# Patient Record
Sex: MALE | Race: WHITE | NOT HISPANIC OR LATINO | Employment: FULL TIME | URBAN - METROPOLITAN AREA
[De-identification: names, ages, dates, MRNs, and addresses within clinical notes are randomized per-mention and may not be internally consistent; named-entity substitution may affect disease eponyms.]

---

## 2022-02-15 ENCOUNTER — APPOINTMENT (EMERGENCY)
Dept: RADIOLOGY | Facility: HOSPITAL | Age: 57
End: 2022-02-15
Payer: COMMERCIAL

## 2022-02-15 ENCOUNTER — HOSPITAL ENCOUNTER (EMERGENCY)
Facility: HOSPITAL | Age: 57
Discharge: HOME | End: 2022-02-15
Attending: EMERGENCY MEDICINE | Admitting: EMERGENCY MEDICINE
Payer: COMMERCIAL

## 2022-02-15 VITALS
BODY MASS INDEX: 28.62 KG/M2 | DIASTOLIC BLOOD PRESSURE: 88 MMHG | HEART RATE: 64 BPM | WEIGHT: 235 LBS | OXYGEN SATURATION: 97 % | SYSTOLIC BLOOD PRESSURE: 137 MMHG | RESPIRATION RATE: 17 BRPM | TEMPERATURE: 98 F | HEIGHT: 76 IN

## 2022-02-15 DIAGNOSIS — R06.02 SHORTNESS OF BREATH: ICD-10-CM

## 2022-02-15 DIAGNOSIS — R07.89 ATYPICAL CHEST PAIN: Primary | ICD-10-CM

## 2022-02-15 LAB
2HR DELTA HS TROPONIN: 0 NG/L
ALBUMIN SERPL BCP-MCNC: 4.1 G/DL (ref 3.5–5)
ALP SERPL-CCNC: 81 U/L (ref 46–116)
ALT SERPL W P-5'-P-CCNC: 29 U/L (ref 12–78)
ANION GAP SERPL CALCULATED.3IONS-SCNC: 10 MMOL/L (ref 4–13)
APTT PPP: 28 SECONDS (ref 23–37)
AST SERPL W P-5'-P-CCNC: 19 U/L (ref 5–45)
ATRIAL RATE: 76 BPM
ATRIAL RATE: 80 BPM
BASOPHILS # BLD AUTO: 0.01 THOUSANDS/ΜL (ref 0–0.1)
BASOPHILS NFR BLD AUTO: 0 % (ref 0–1)
BILIRUB SERPL-MCNC: 0.2 MG/DL (ref 0.2–1)
BUN SERPL-MCNC: 21 MG/DL (ref 5–25)
CALCIUM SERPL-MCNC: 8.9 MG/DL (ref 8.3–10.1)
CARDIAC TROPONIN I PNL SERPL HS: 2 NG/L
CARDIAC TROPONIN I PNL SERPL HS: 2 NG/L
CHLORIDE SERPL-SCNC: 103 MMOL/L (ref 100–108)
CO2 SERPL-SCNC: 28 MMOL/L (ref 21–32)
CREAT SERPL-MCNC: 0.95 MG/DL (ref 0.6–1.3)
D DIMER PPP FEU-MCNC: 0.4 UG/ML FEU
EOSINOPHIL # BLD AUTO: 0.1 THOUSAND/ΜL (ref 0–0.61)
EOSINOPHIL NFR BLD AUTO: 3 % (ref 0–6)
ERYTHROCYTE [DISTWIDTH] IN BLOOD BY AUTOMATED COUNT: 11.9 % (ref 11.6–15.1)
FLUAV RNA RESP QL NAA+PROBE: NEGATIVE
FLUBV RNA RESP QL NAA+PROBE: NEGATIVE
GFR SERPL CREATININE-BSD FRML MDRD: 88 ML/MIN/1.73SQ M
GLUCOSE SERPL-MCNC: 117 MG/DL (ref 65–140)
HCT VFR BLD AUTO: 43.6 % (ref 36.5–49.3)
HGB BLD-MCNC: 14.4 G/DL (ref 12–17)
IMM GRANULOCYTES # BLD AUTO: 0.02 THOUSAND/UL (ref 0–0.2)
IMM GRANULOCYTES NFR BLD AUTO: 1 % (ref 0–2)
INR PPP: 0.98 (ref 0.84–1.19)
LYMPHOCYTES # BLD AUTO: 1.02 THOUSANDS/ΜL (ref 0.6–4.47)
LYMPHOCYTES NFR BLD AUTO: 28 % (ref 14–44)
MCH RBC QN AUTO: 30.3 PG (ref 26.8–34.3)
MCHC RBC AUTO-ENTMCNC: 33 G/DL (ref 31.4–37.4)
MCV RBC AUTO: 92 FL (ref 82–98)
MONOCYTES # BLD AUTO: 0.38 THOUSAND/ΜL (ref 0.17–1.22)
MONOCYTES NFR BLD AUTO: 10 % (ref 4–12)
NEUTROPHILS # BLD AUTO: 2.16 THOUSANDS/ΜL (ref 1.85–7.62)
NEUTS SEG NFR BLD AUTO: 58 % (ref 43–75)
NRBC BLD AUTO-RTO: 0 /100 WBCS
NT-PROBNP SERPL-MCNC: 18 PG/ML
P AXIS: 21 DEGREES
P AXIS: 24 DEGREES
PLATELET # BLD AUTO: 154 THOUSANDS/UL (ref 149–390)
PMV BLD AUTO: 10.2 FL (ref 8.9–12.7)
POTASSIUM SERPL-SCNC: 4.4 MMOL/L (ref 3.5–5.3)
PR INTERVAL: 162 MS
PR INTERVAL: 170 MS
PROT SERPL-MCNC: 7.2 G/DL (ref 6.4–8.2)
PROTHROMBIN TIME: 12.9 SECONDS (ref 11.6–14.5)
QRS AXIS: 11 DEGREES
QRS AXIS: 5 DEGREES
QRSD INTERVAL: 142 MS
QRSD INTERVAL: 142 MS
QT INTERVAL: 384 MS
QT INTERVAL: 386 MS
QTC INTERVAL: 434 MS
QTC INTERVAL: 442 MS
RBC # BLD AUTO: 4.75 MILLION/UL (ref 3.88–5.62)
RSV RNA RESP QL NAA+PROBE: NEGATIVE
SARS-COV-2 RNA RESP QL NAA+PROBE: NEGATIVE
SODIUM SERPL-SCNC: 141 MMOL/L (ref 136–145)
T WAVE AXIS: 41 DEGREES
T WAVE AXIS: 49 DEGREES
VENTRICULAR RATE: 76 BPM
VENTRICULAR RATE: 80 BPM
WBC # BLD AUTO: 3.69 THOUSAND/UL (ref 4.31–10.16)

## 2022-02-15 PROCEDURE — 36415 COLL VENOUS BLD VENIPUNCTURE: CPT

## 2022-02-15 PROCEDURE — 99285 EMERGENCY DEPT VISIT HI MDM: CPT

## 2022-02-15 PROCEDURE — 99285 EMERGENCY DEPT VISIT HI MDM: CPT | Performed by: EMERGENCY MEDICINE

## 2022-02-15 PROCEDURE — 85379 FIBRIN DEGRADATION QUANT: CPT | Performed by: EMERGENCY MEDICINE

## 2022-02-15 PROCEDURE — 84484 ASSAY OF TROPONIN QUANT: CPT

## 2022-02-15 PROCEDURE — 85730 THROMBOPLASTIN TIME PARTIAL: CPT

## 2022-02-15 PROCEDURE — 93005 ELECTROCARDIOGRAM TRACING: CPT

## 2022-02-15 PROCEDURE — 85610 PROTHROMBIN TIME: CPT

## 2022-02-15 PROCEDURE — 71045 X-RAY EXAM CHEST 1 VIEW: CPT

## 2022-02-15 PROCEDURE — 85025 COMPLETE CBC W/AUTO DIFF WBC: CPT

## 2022-02-15 PROCEDURE — 83880 ASSAY OF NATRIURETIC PEPTIDE: CPT | Performed by: EMERGENCY MEDICINE

## 2022-02-15 PROCEDURE — 0241U HB NFCT DS VIR RESP RNA 4 TRGT: CPT

## 2022-02-15 PROCEDURE — 93010 ELECTROCARDIOGRAM REPORT: CPT | Performed by: INTERNAL MEDICINE

## 2022-02-15 PROCEDURE — 80053 COMPREHEN METABOLIC PANEL: CPT

## 2022-02-15 RX ORDER — LISINOPRIL 10 MG/1
10 TABLET ORAL DAILY
COMMUNITY

## 2022-02-15 RX ORDER — METOPROLOL SUCCINATE 50 MG/1
50 TABLET, EXTENDED RELEASE ORAL DAILY
COMMUNITY

## 2022-02-15 RX ORDER — ROSUVASTATIN CALCIUM 10 MG/1
10 TABLET, COATED ORAL DAILY
COMMUNITY

## 2022-02-15 NOTE — ED NOTES
Pt arrives to the ED from home with c/o shortness of breath this morning  Pt reports that he has had chest pain "on and off" for the past "couple of months"  Pt reports chest pain as "sharp, all over my body"  Reports hx of x2 stent placements "a long time ago"  Reports following with Cardiologist    Denies pulmonary history, reports smoking daily  Also reports drinking a 6 pack of beer daily after work  Pt stated, "I think I had COVID 3-4 weeks ago, it was running through work  I never got tested, I just stayed home"  Reports he is vaccinated against COVID  Pt AO x 4, talking in full sentences, appears in no acute distress at this time       Stan Wheat, RN  02/15/22 9614

## 2022-02-15 NOTE — ED PROVIDER NOTES
History  Chief Complaint   Patient presents with    Chest Pain     pt c/o of sob and chest pain  14-year-old male with past medical history of hypertension, high cholesterol, previous stents presents for evaluation of shortness of breath and sharp chest pain, states that he has been having these symptoms for the last few months since at least October and did discuss it with his cardiologist then though nothing was found, this morning felt very short of breath with tingling down his arms while he was laying down, got better after couple of hours currently just has some mild diffuse chest pain that is sharp, no further shortness of breath  He states that these episodes happen when he is at rest and when he is exerting himself,  Took aspirin prior to arrival   Thinks he had COVID a couple months ago but was never tested, vaccinated with 2 doses of Ray Kim did not get his booster, no sick contacts     No history of DVT or PE  Patient does smoke daily and drinks alcohol daily, does not want to speak to [facility] for assistance in quitting          Prior to Admission Medications   Prescriptions Last Dose Informant Patient Reported? Taking?   lisinopril (ZESTRIL) 10 mg tablet   Yes No   Sig: Take 10 mg by mouth daily   metoprolol succinate (TOPROL-XL) 50 mg 24 hr tablet   Yes No   Sig: Take 50 mg by mouth daily   rosuvastatin (CRESTOR) 10 MG tablet   Yes No   Sig: Take 10 mg by mouth daily      Facility-Administered Medications: None       Past Medical History:   Diagnosis Date    High cholesterol     Hypertension        Past Surgical History:   Procedure Laterality Date    APPENDECTOMY         No family history on file  I have reviewed and agree with the history as documented      E-Cigarette/Vaping     E-Cigarette/Vaping Substances     Social History     Tobacco Use    Smoking status: Current Every Day Smoker    Smokeless tobacco: Never Used   Substance Use Topics    Alcohol use: Yes     Comment: 6 pack a day    Drug use: Not on file       Review of Systems   Constitutional: Negative for appetite change, chills and fever  HENT: Negative for rhinorrhea and sore throat  Eyes: Negative for photophobia and visual disturbance  Respiratory: Positive for chest tightness and shortness of breath  Negative for cough  Cardiovascular: Positive for chest pain  Negative for palpitations  Gastrointestinal: Negative for abdominal pain and diarrhea  Genitourinary: Negative for dysuria, frequency and urgency  Skin: Negative for rash  Neurological: Negative for dizziness and weakness  All other systems reviewed and are negative  Physical Exam  Physical Exam  Vitals and nursing note reviewed  Constitutional:       Appearance: He is well-developed  HENT:      Head: Normocephalic and atraumatic  Right Ear: External ear normal       Left Ear: External ear normal    Eyes:      Conjunctiva/sclera: Conjunctivae normal       Pupils: Pupils are equal, round, and reactive to light  Neck:      Vascular: No JVD  Trachea: No tracheal deviation  Cardiovascular:      Rate and Rhythm: Normal rate and regular rhythm  Heart sounds: Normal heart sounds  No murmur heard  No friction rub  No gallop  Pulmonary:      Effort: Pulmonary effort is normal  No respiratory distress  Breath sounds: No stridor  No decreased breath sounds, wheezing or rales  Chest:      Chest wall: No mass or tenderness  Abdominal:      General: There is no distension  Palpations: Abdomen is soft  There is no mass  Tenderness: There is no abdominal tenderness  There is no guarding or rebound  Musculoskeletal:         General: Normal range of motion  Cervical back: Normal range of motion and neck supple  Right lower leg: No edema  Left lower leg: No edema  Skin:     General: Skin is warm and dry  Coloration: Skin is not pale  Findings: No erythema or rash     Neurological:      Mental Status: He is alert and oriented to person, place, and time  Cranial Nerves: No cranial nerve deficit  Vital Signs  ED Triage Vitals   Temperature Pulse Respirations Blood Pressure SpO2   02/15/22 0607 02/15/22 0557 02/15/22 0557 02/15/22 0557 02/15/22 0557   98 °F (36 7 °C) 80 18 157/95 96 %      Temp Source Heart Rate Source Patient Position - Orthostatic VS BP Location FiO2 (%)   02/15/22 0607 02/15/22 0615 02/15/22 0557 02/15/22 0557 --   Oral Monitor Lying Right arm       Pain Score       02/15/22 0830       No Pain           Vitals:    02/15/22 0600 02/15/22 0615 02/15/22 0630 02/15/22 0830   BP: 150/94  130/85 137/88   Pulse: 78 72 72 64   Patient Position - Orthostatic VS:             Visual Acuity      ED Medications  Medications - No data to display    Diagnostic Studies  Results Reviewed     Procedure Component Value Units Date/Time    COVID/FLU/RSV [[id number]]  (Normal) Collected: 02/15/22 0601    Lab Status: Final result Specimen: Nares from Nose Updated: 02/15/22 1018     SARS-CoV-2 Negative     INFLUENZA A PCR Negative     INFLUENZA B PCR Negative     RSV PCR Negative    Narrative:      FOR PEDIATRIC PATIENTS - copy/paste COVID Guidelines URL to browser: [email or web address]  [email or web address]    SARS-CoV-2 assay is a Nucleic Acid Amplification assay intended for the  qualitative detection of nucleic acid from SARS-CoV-2 in nasopharyngeal  swabs  Results are for the presumptive identification of SARS-CoV-2 RNA  Positive results are indicative of infection with SARS-CoV-2, the virus  causing COVID-19, but do not rule out bacterial infection or co-infection  with other viruses  Laboratories within the [place] and its  territories are required to report all positive results to the appropriate  public health authorities   Negative results do not preclude SARS-CoV-2  infection and should not be used as the sole basis for treatment or other  patient management decisions  Negative results must be combined with  clinical observations, patient history, and epidemiological information  This test has not been FDA cleared or approved  This test has been authorized by FDA under an Emergency Use Authorization  (EUA)  This test is only authorized for the duration of time the  declaration that circumstances exist justifying the authorization of the  emergency use of an in vitro diagnostic tests for detection of SARS-CoV-2  virus and/or diagnosis of COVID-19 infection under section 564(b)(1) of  the Act, 21 U  S C  484WIZ-1(B)(2), unless the authorization is terminated  or revoked sooner  The test has been validated but independent review by FDA  and CLIA is pending  Test performed using [employer] GeneXpert: This RT-PCR assay targets N2,  a region unique to SARS-CoV-2  A conserved region in the E-gene was chosen  for pan-Sarbecovirus detection which includes SARS-CoV-2      HS Troponin I 2hr [[id number]]  (Normal) Collected: 02/15/22 0841    Lab Status: Final result Specimen: Blood from Arm, Right Updated: 02/15/22 0914     hs TnI 2hr 2 ng/L      Delta 2hr hsTnI 0 ng/L     NT-BNP PRO [[id number]]  (Normal) Collected: 02/15/22 0601    Lab Status: Final result Specimen: Blood from Arm, Right Updated: 02/15/22 0728     NT-proBNP 18 pg/mL     D-dimer, quantitative [[id number]]  (Normal) Collected: 02/15/22 0601    Lab Status: Final result Specimen: Blood from Arm, Right Updated: 02/15/22 0701     D-Dimer, Quant 0 40 ug/ml FEU     Comprehensive metabolic panel [[id number]] Collected: 02/15/22 0601    Lab Status: Final result Specimen: Blood from Arm, Right Updated: 02/15/22 0700     Sodium 141 mmol/L      Potassium 4 4 mmol/L      Chloride 103 mmol/L      CO2 28 mmol/L      ANION GAP 10 mmol/L      BUN 21 mg/dL      Creatinine 0 95 mg/dL      Glucose 117 mg/dL      Calcium 8 9 mg/dL      AST 19 U/L      ALT 29 U/L      Alkaline Phosphatase 81 U/L      Total Protein 7 2 g/dL Albumin 4 1 g/dL      Total Bilirubin 0 20 mg/dL      eGFR 88 ml/min/1 73sq m     Narrative:      National Kidney Disease Foundation guidelines for Chronic Kidney Disease (CKD):     Stage 1 with normal or high GFR (GFR > 90 mL/min/1 73 square meters)    Stage 2 Mild CKD (GFR = 60-89 mL/min/1 73 square meters)    Stage 3A Moderate CKD (GFR = 45-59 mL/min/1 73 square meters)    Stage 3B Moderate CKD (GFR = 30-44 mL/min/1 73 square meters)    Stage 4 Severe CKD (GFR = 15-29 mL/min/1 73 square meters)    Stage 5 End Stage CKD (GFR <15 mL/min/1 73 square meters)  Note: GFR calculation is accurate only with a steady state creatinine    HS Troponin 0hr (reflex protocol) [[id number]]  (Normal) Collected: 02/15/22 0601    Lab Status: Final result Specimen: Blood from Arm, Right Updated: 02/15/22 0653     hs TnI 0hr 2 ng/L     Protime-INR [[id number]]  (Normal) Collected: 02/15/22 0601    Lab Status: Final result Specimen: Blood from Arm, Right Updated: 02/15/22 0637     Protime 12 9 seconds      INR 0 98    APTT [[id number]]  (Normal) Collected: 02/15/22 0601    Lab Status: Final result Specimen: Blood from Arm, Right Updated: 02/15/22 0637     PTT 28 seconds     CBC and differential [[id number]]  (Abnormal) Collected: 02/15/22 0601    Lab Status: Final result Specimen: Blood from Arm, Right Updated: 02/15/22 0611     WBC 3 69 Thousand/uL      RBC 4 75 Million/uL      Hemoglobin 14 4 g/dL      Hematocrit 43 6 %      MCV 92 fL      MCH 30 3 pg      MCHC 33 0 g/dL      RDW 11 9 %      MPV 10 2 fL      Platelets 979 Thousands/uL      nRBC 0 /100 WBCs      Neutrophils Relative 58 %      Immat GRANS % 1 %      Lymphocytes Relative 28 %      Monocytes Relative 10 %      Eosinophils Relative 3 %      Basophils Relative 0 %      Neutrophils Absolute 2 16 Thousands/µL      Immature Grans Absolute 0 02 Thousand/uL      Lymphocytes Absolute 1 02 Thousands/µL      Monocytes Absolute 0 38 Thousand/µL      Eosinophils Absolute 0 10 Thousand/µL      Basophils Absolute 0 01 Thousands/µL                  XR chest 1 view portable   Final Result by Orlando Mckenna MD (13/48 [id number])      No acute cardiopulmonary disease  Workstation performed: [id number]                    Procedures  Procedures         ED Course  ED Course as of 02/15/22 2210   Tue Feb 15, 2022   8327 Procedure Note: EKG  Date/Time: 02/15/22 5:56 AM   Performed by: Lucio Lopez  Authorized by: Lucio Lopez  Indications / Diagnosis: CP  ECG reviewed by me, the ED Provider: yes   The EKG demonstrates:  Rhythm: normal sinus  Intervals: normal intervals  Axis: normal axis  QRS/Blocks: RBBB  ST Changes: No acute ST Changes, no STD/TRA        4701 Procedure Note: EKG  Date/Time: 02/15/22 6:26 AM   Performed by: Lucio Lopez  Authorized by: Lucio Lopez  Indications / Diagnosis: CP  ECG reviewed by me, the ED Provider: yes   The EKG demonstrates:  Rhythm: normal sinus  Intervals: normal intervals  Axis: normal axis  QRS/Blocks: normal QRS  ST Changes: No acute ST Changes, no STD/TRA                   HEART Risk Score      Most Recent Value   Heart Score Risk Calculator    History 0 Filed at: 02/15/2022 0814   ECG 1 Filed at: 02/15/2022 6291   Age 1 Filed at: 02/15/2022 7278   Risk Factors 1 Filed at: 02/15/2022 0814   Troponin 0 Filed at: 02/15/2022 3586   HEART Score 3 Filed at: 02/15/2022 6037                                      MDM  Number of Diagnoses or Management Options  Diagnosis management comments: 59-year-old male presents for evaluation of shortness of breath, chest pain, initial EKG without any acute ischemic changes will obtain lab work, COVID swab, chest x-ray will re-evaluate      Disposition  Final diagnoses:   Atypical chest pain   Shortness of breath     Time reflects when diagnosis was documented in both MDM as applicable and the Disposition within this note     Time User Action Codes Description Comment    2/15/2022  6:57 AM Bethany Rodriguez Add [R04 05] Atypical chest pain     2/15/2022  6:57 AM Indiana Caller Add [R06 02] Shortness of breath       ED Disposition     ED Disposition Condition Date/Time Comment    Discharge Stable Tue Feb 15, 2022  9:11 AM Rodney Velarde discharge to home/self care  Follow-up Information     Follow up With Specialties Details Why Contact Info Additional Lroie Dewitt [address] Emergency Medicine  If symptoms worsen [address] [place],[address] [address]  [address] [facility], [address] [place], [facility], [facility] [address]    Maureen Darien Cardiology [facility] Cardiology Schedule an appointment as soon as possible for a visit   [address] [facility] [address]  [address]  Cardiology [facility], [address] [place], [place], [address] [facility] Pulmonology   [address]  [facility] [id number] [phone] [facility], [address], [place], [place], [address] [facility]          Discharge Medication List as of 2/15/2022  9:12 AM      CONTINUE these medications which have NOT CHANGED    Details   lisinopril (ZESTRIL) 10 mg tablet Take 10 mg by mouth daily, Historical Med      metoprolol succinate (TOPROL-XL) 50 mg 24 hr tablet Take 50 mg by mouth daily, Historical Med      rosuvastatin (CRESTOR) 10 MG tablet Take 10 mg by mouth daily, Historical Med             No discharge procedures on file      PDMP Review     None          ED Provider  Electronically Signed by           Fay Rondon DO  02/15/22 3701

## 2022-02-15 NOTE — DISCHARGE INSTRUCTIONS
Please follow-up with the primary care provider for further care, if symptoms worsen please return to emergency department

## 2022-03-09 ENCOUNTER — CONSULT (OUTPATIENT)
Dept: PULMONOLOGY | Facility: HOSPITAL | Age: 57
End: 2022-03-09
Payer: COMMERCIAL

## 2022-03-09 VITALS
BODY MASS INDEX: 29.83 KG/M2 | HEART RATE: 76 BPM | SYSTOLIC BLOOD PRESSURE: 128 MMHG | OXYGEN SATURATION: 96 % | TEMPERATURE: 98.3 F | WEIGHT: 245 LBS | HEIGHT: 76 IN | DIASTOLIC BLOOD PRESSURE: 80 MMHG

## 2022-03-09 DIAGNOSIS — J34.2 DEVIATED SEPTUM: ICD-10-CM

## 2022-03-09 DIAGNOSIS — R06.02 SHORTNESS OF BREATH: ICD-10-CM

## 2022-03-09 DIAGNOSIS — G47.33 OSA (OBSTRUCTIVE SLEEP APNEA): Primary | ICD-10-CM

## 2022-03-09 PROCEDURE — 99204 OFFICE O/P NEW MOD 45 MIN: CPT | Performed by: INTERNAL MEDICINE

## 2022-03-09 RX ORDER — OMEGA-3S/DHA/EPA/FISH OIL/D3 300MG-1000
400 CAPSULE ORAL DAILY
COMMUNITY

## 2022-03-09 RX ORDER — DIPHENOXYLATE HYDROCHLORIDE AND ATROPINE SULFATE 2.5; .025 MG/1; MG/1
1 TABLET ORAL DAILY
COMMUNITY

## 2022-03-09 NOTE — PROGRESS NOTES
Pulmonary Initial Visit  Yissel Elder 61 y o  male MRN: [id number]  @ Encounter: [id number]      Impressions/Recommendations:   Pt is a 60 yo M w/ pmhx sig for CAD who presents to establish pulmonary care  The patient notes difficulty breathing which is chronic and likely related to deviated septum and associated air flow limitations  It is possible that this is a significant contributor to his intolerance of cpap  Obstructive Sleep Apnea  -  Check sleep study  -  Pt would be interested in restarting CPAP  -  Would wait until after ENT evaluation    Deviated septum  -  Pt reports significant difficulty breathing through nose  -  Has decreased flow through right nare    Etoh dependence  -  Has been able to cut back without signs of withdrawal  -  Counseled on signs of etoh withdrawal and when to seek emergency care    Nicotine dependence  -  Counseled for 3 mins on tobacco cessation    Dyspnea on exertion  -  Check PFTs given smoking history  -  Encouraged patient to continue activity as tolerated    Patient may follow up in 4-6 months or sooner as necessary  Orders Placed This Encounter   Procedures    Ambulatory Referral to Otolaryngology     Standing Status:   Future     Standing Expiration Date:   3/9/2023     Referral Priority:   Routine     Referral Type:   Consult - AMB     Referral Reason:   Specialty Services Required     Requested Specialty:   Otolaryngology     Number of Visits Requested:   1     Expiration Date:   3/9/2023    Complete PFT with post bronchodilator     Standing Status:   Future     Standing Expiration Date:   3/9/2023     Order Specific Question:   Would you like to add MVV, MIP, MEP into this order?      Answer:   No    Split Study     Standing Status:   Future     Standing Expiration Date:   3/9/2023     Order Specific Question:   Sleep History/Symptoms: (Must have at least one other symptoms than snoring )     Answer:   Snoring     Order Specific Question:   Sleep History/Symptoms: (Must have at least one other symptoms than snoring )     Answer:   Nocturnal choking     Order Specific Question:   Sleep History/Symptoms: (Must have at least one other symptoms than snoring )     Answer:   Excessive Daytime Sleepiness     Order Specific Question:   Sleep History/Symptoms: (Must have at least one other symptoms than snoring )     Answer:   BMI > 30     Order Specific Question:   AHI indications     Answer:   Previous CPAP use, not currently using     Order Specific Question:   Split Protocol     Answer:   Split AHI (enter AHI number in comments)     Comments:   5     Order Specific Question:   Service requested:     Answer:   Referring provider to provide patient f/u     Order Specific Question:   Preference for reading physician (Leave blank if no preference )     Answer:   Serena Francisco Specific Question:   Select Preferred Provider (Leave blank if no preference )     Answer:   Diane         History of Present Illness   HPI:  Maureen Talley is a 61 y o  male with pmhx sig for CAD who presents to establish pulmonary care  The patient woke up 3 weeks ago and had a difficulty breathing  He presented to ER and was discharged home  He does have a history of heart disease with the placement of 3 stents at age 61  He was diagnosed with sleep apnea  He has snored since he was a teenager  He was diagnosed officially about 30 years ago  He was intolerant of cpap about 30 years go  He has not tried any new devices  The patient typically sleeps from 8pm-4am      He works in construction as a superintendent  He smokes about 1-2 cigars daily  He drinks about 48 beers/week  He has recently cut back over the past 3 weeks; he is down to about 8 week  Occasional marijuana use  The patient is able to split firewood  He does report he has to take more breaks  It is slightly worse this spring  He has never tried an inhaler  He denies wheezing  Review of systems:  Review of systems was completed and was otherwise negative except as listed in HPI  Historical Information   Past Medical History:   Diagnosis Date    High cholesterol     Hypertension      Past Surgical History:   Procedure Laterality Date    APPENDECTOMY       Family History   Problem Relation Age of Onset    Heart disease Father     Lung disease Neg Hx      Social History     Socioeconomic History    Marital status: /Civil Union     Spouse name: None    Number of children: None    Years of education: None    Highest education level: None   Occupational History    None   Tobacco Use    Smoking status: Current Every Day Smoker     Types: Cigars     Start date: 2002    Smokeless tobacco: Never Used    Tobacco comment: two cigars a day at his worst    Substance and Sexual Activity    Alcohol use: Yes     Comment: 6 pack a day    Drug use: Not Currently     Types: Marijuana     Comment: has not smpkoed in the last month   Sexual activity: None   Other Topics Concern    None   Social History Narrative    None     Social Determinants of Health     Financial Resource Strain: Not on file   Food Insecurity: Not on file   Transportation Needs: Not on file   Physical Activity: Not on file   Stress: Not on file   Social Connections: Not on file   Intimate Partner Violence: Not on file   Housing Stability: Not on file       Meds/Allergies   No current facility-administered medications for this visit  (Not in a hospital admission)    Allergies   Allergen Reactions    Sulfa Antibiotics Rash     Since infancy, does not know reaction        Vitals: Blood pressure 128/80, pulse 76, temperature 98 3 °F (36 8 °C), temperature source Tympanic, height 6' 4" (1 93 m), weight 111 kg (245 lb), SpO2 96 % , RA, Body mass index is 29 82 kg/m²      Physical Exam  General: Pleasant, Awake alert and oriented x 3, conversant without conversational dyspnea, NAD, normal affect  HEENT: PERRL, Sclera noninjected, nonicteric OU, Nares patent, no nasal flaring, no nasal drainage, Mucous membranes, moist, no oral lesions, normal dentition  NECK: Trachea midline, no accessory muscle use, no stridor, no cervical or supraclavicular adenopathy, JVP not elevated  CARDIAC: Reg, single s1/S2, no m/r/g  PULM: CTA bl  CHEST: No gross deformities, equal chest expansion on inspiration bilaterally  ABD: Normoactive bowel sounds, soft nontender, nondistended, no rebound, no rigidity, no guarding  EXT: No cyanosis, no clubbing, no edema, normal capillary refill  SKIN:  No rashes, no lesions  NEURO: no focal neurologic deficits, AAOx3, moving all extremities appropriately    Labs: I have personally reviewed pertinent lab results  Lab Results   Component Value Date    SODIUM 141 02/15/2022    K 4 4 02/15/2022     02/15/2022    CO2 28 02/15/2022    BUN 21 02/15/2022    CREATININE 0 95 02/15/2022    GLUC 117 02/15/2022    CALCIUM 8 9 02/15/2022     Lab Results   Component Value Date    WBC 3 69 (L) 02/15/2022    HGB 14 4 02/15/2022    HCT 43 6 02/15/2022    MCV 92 02/15/2022     02/15/2022     Imaging and other studies: I have personally reviewed pertinent reports  and I have personally reviewed pertinent films in PACS  2/15/22:  Cardiomediastinal silhouette appears unremarkable  The lungs are clear  No pneumothorax or pleural effusion  Osseous structures appear within normal limits for patient age  Pulmonary function testing:    No pulmonary function testing available for review  Echocardiogram:   No echocardiogram available for review  EKG, Pathology, and Other Studies: I have personally reviewed pertinent reports  and I have personally reviewed pertinent films in PACS  2/15/22:  Normal sinus rhythm  Right bundle branch block      Shameka Terry

## 2022-03-22 ENCOUNTER — TELEPHONE (OUTPATIENT)
Dept: NEUROLOGY | Facility: CLINIC | Age: 57
End: 2022-03-22

## 2022-03-22 NOTE — TELEPHONE ENCOUNTER
----- Message from Raz Mack MD sent at 3/21/2022  4:23 PM EDT -----  approved  ----- Message -----  From: Lucy Dobson  Sent: [id number]/68/7490   9:47 AM EDT  To: Sleep Medicine Nancy Provider    This sleep study needs approval      If approved please sign and return to clerical pool  If denied please include reasons why  Also provide alternative testing if warranted  Please sign and return to clerical pool

## 2022-04-25 ENCOUNTER — HOSPITAL ENCOUNTER (OUTPATIENT)
Dept: PULMONOLOGY | Facility: HOSPITAL | Age: 57
Discharge: HOME | End: 2022-04-25
Attending: INTERNAL MEDICINE
Payer: COMMERCIAL

## 2022-04-25 DIAGNOSIS — R06.02 SHORTNESS OF BREATH: ICD-10-CM

## 2022-04-25 PROCEDURE — 94726 PLETHYSMOGRAPHY LUNG VOLUMES: CPT

## 2022-04-25 PROCEDURE — 94760 N-INVAS EAR/PLS OXIMETRY 1: CPT

## 2022-04-25 PROCEDURE — 94060 EVALUATION OF WHEEZING: CPT | Performed by: INTERNAL MEDICINE

## 2022-04-25 PROCEDURE — 94729 DIFFUSING CAPACITY: CPT | Performed by: INTERNAL MEDICINE

## 2022-04-25 PROCEDURE — 94726 PLETHYSMOGRAPHY LUNG VOLUMES: CPT | Performed by: INTERNAL MEDICINE

## 2022-04-25 PROCEDURE — 94729 DIFFUSING CAPACITY: CPT

## 2022-04-25 PROCEDURE — 94060 EVALUATION OF WHEEZING: CPT

## 2022-04-25 RX ORDER — ALBUTEROL SULFATE 2.5 MG/3ML
2.5 SOLUTION RESPIRATORY (INHALATION) EVERY 6 HOURS PRN
Status: DISCONTINUED | OUTPATIENT
Start: 2022-04-25 | End: 2022-04-29 | Stop reason: HOSPADM

## 2022-04-25 RX ADMIN — ALBUTEROL SULFATE 2.5 MG: 2.5 SOLUTION RESPIRATORY (INHALATION) at 15:23

## 2022-04-29 ENCOUNTER — TELEPHONE (OUTPATIENT)
Dept: PULMONOLOGY | Facility: CLINIC | Age: 57
End: 2022-04-29

## 2022-04-29 DIAGNOSIS — J34.2 DEVIATED SEPTUM: Primary | ICD-10-CM

## 2022-04-29 NOTE — TELEPHONE ENCOUNTER
Call patient discussed results of pulmonary function testing  The majority the findings were consistent with his known smoking history  There were some changes to the flow volume loops which may be consistent with his deviated septum verses other upper airway abnormality  He did report previously he would follow-up with ENT  I did call and discuss that this would be beneficial in may be consider laryngoscopy given the findings on PFTs as well as his history did of deviated septum  I referrals placed for ENT

## 2022-06-14 ENCOUNTER — OFFICE VISIT (OUTPATIENT)
Dept: PULMONOLOGY | Facility: HOSPITAL | Age: 57
End: 2022-06-14
Payer: COMMERCIAL

## 2022-06-14 VITALS
HEART RATE: 67 BPM | DIASTOLIC BLOOD PRESSURE: 68 MMHG | WEIGHT: 240 LBS | HEIGHT: 76 IN | RESPIRATION RATE: 16 BRPM | BODY MASS INDEX: 29.22 KG/M2 | SYSTOLIC BLOOD PRESSURE: 124 MMHG | OXYGEN SATURATION: 96 %

## 2022-06-14 DIAGNOSIS — J34.2 DEVIATED SEPTUM: ICD-10-CM

## 2022-06-14 DIAGNOSIS — G47.33 OSA (OBSTRUCTIVE SLEEP APNEA): Primary | ICD-10-CM

## 2022-06-14 DIAGNOSIS — F10.10 ETOH ABUSE: ICD-10-CM

## 2022-06-14 PROCEDURE — 99213 OFFICE O/P EST LOW 20 MIN: CPT | Performed by: INTERNAL MEDICINE

## 2022-06-14 NOTE — PROGRESS NOTES
Progress Note - Pulmonary   Christiano Teagan 61 y o  male MRN: [id number]   Encounter: [id number]      Assessment/Plan:  Patient is a 71-year-old male with past medical history significant for deviated septum, history of sleep apnea who presents for routine follow-up  The patient's primary complaint at this time is excessive daytime sleepiness  This is likely related to untreated sleep apnea  The patient is scheduled to undergo a sleep study in approximately 2 weeks  He would like to wait to see the results and start on likely PAP therapy prior to doing any other interventions  Obstructive Sleep Apnea  -  Check sleep study - schedule 6/29  -  Pt would be interested in restarting CPAP   -  would like to further evaluate sleep apnea prior to undergoing any additional procedures    Deviated septum  -  Pt reports significant difficulty breathing through nose  -  Has decreased flow through right nare  -  Followed by ENT     Etoh dependence  -   Currently drinking 6 beers  -   counseled on cessation     Nicotine dependence  -  Counseled for 3 mins on tobacco cessation  -  Occasional cigar     Dyspnea on exertion  -  Encouraged patient to continue activity as tolerated        Patient may follow up in 4 months or sooner as necessary  Orders:  No orders of the defined types were placed in this encounter  Subjective:   Patient reports overall he is doing well  He continues to smoke a couple of cigars a day  He continues to drink approximately 6 pack a day  The patient does report excessive daytime sleepiness  This is likely related to his untreated sleep apnea  The patient was evaluated by ENT in recommended for surgery  He was told by his ENT but this would not necessarily treat his sleep apnea  The patient would like to hold on further interventions until he gets his CPAP study and improved his daytime sleepiness      Inhaler Regimen:  None    Remainder of review of systems negative except as described in HPI       The following portions of the patient's history were reviewed and updated as appropriate: allergies, current medications, past family history, past medical history, past social history, past surgical history and problem list      Objective:   Vitals: Blood pressure 124/68, pulse 67, resp  rate 16, height 6' 4" (1 93 m), weight 109 kg (240 lb), SpO2 96 % , RA, Body mass index is 29 21 kg/m²  Physical Exam  Gen:  Pleasant, awake, alert, oriented x 3, no acute distress  HEENT: Mucous membranes moist, no oral lesions, no thrush  NECK: No accessory muscle use, JVP not elevated  Cardiac:  Regular rate rhythm, single S1, single S2, no murmurs, no rubs, no gallops  Lungs:  Clear to auscultation bilaterally  Abdomen: normoactive bowel sounds, soft nontender, nondistended, no rebound or rigidity, no guarding  Extremities: no cyanosis, no clubbing, no LE edema  MSK:  Strength equal in all extremities  Derm:  No rashes/lesions noted  Neuro:  Appropriate mood/affect    Labs: I have personally reviewed pertinent lab results  Lab Results   Component Value Date    WBC 3 69 (L) 02/15/2022    HGB 14 4 02/15/2022     02/15/2022     Lab Results   Component Value Date    CREATININE 0 95 02/15/2022        Imaging and other studies: I have personally reviewed pertinent reports  and I have personally reviewed pertinent films in PACS  CXR 2/15/22  My interpretation:  No acute intrathoracic process    Pulmonary Function Testing: I have personally reviewed pertinent reports  and I have personally reviewed pertinent films in PACS  4/25/22:  Mild diffusion defect w/ air trapping  FEV1/FVC Ratio: 78 %  Forced Vital Capacity: 5 76 L    99 % predicted  FEV1: 4 47 L     100 % predicted  Post bronchodilator response: Not significant     Lung volumes by body plethysmography:   Total Lung Capacity 127 % predicted   Residual volume 186 % predicted     DLCO corrected for patients hemoglobin level: 59 %    Masood Cheema MD [facility]

## 2022-06-29 ENCOUNTER — HOSPITAL ENCOUNTER (OUTPATIENT)
Dept: SLEEP CENTER | Facility: HOSPITAL | Age: 57
Discharge: HOME | End: 2022-06-29
Attending: INTERNAL MEDICINE
Payer: COMMERCIAL

## 2022-06-29 DIAGNOSIS — G47.33 OSA (OBSTRUCTIVE SLEEP APNEA): ICD-10-CM

## 2022-06-29 PROCEDURE — 95811 POLYSOM 6/>YRS CPAP 4/> PARM: CPT | Performed by: INTERNAL MEDICINE

## 2022-06-29 PROCEDURE — 95811 POLYSOM 6/>YRS CPAP 4/> PARM: CPT

## 2022-06-30 DIAGNOSIS — G47.33 OSA (OBSTRUCTIVE SLEEP APNEA): Primary | ICD-10-CM

## 2022-06-30 NOTE — PROGRESS NOTES
Sleep Study Documentation    Pre-Sleep Study       Sleep testing procedure explained to patient:YES    Patient napped prior to study:NO    Caffeine:Dayshift worker after 12PM   Caffeine use:NO    Alcohol:Dayshift workers after 5PM: Alcohol use:YES, beer 4 or more servings    Typical day for patient:YES       Study Documentation    Sleep Study Indications: snoring, nocturnal choking, EDS, and BMI > 30    Sleep Study: Split Optimal PAP pressure: 23/19 cm H2O  Leak:Large  Snore:Eliminated  REM Obtained:yes  Supplemental O2: no    Minimum SaO2 65%  Baseline SaO2 95%  PAP mask tried (list all) large ResMed AirFit F20 full face interface  PAP mask choice (final) large ResMed AirFit F20 full face interface  PAP mask type:full face  PAP pressure at which snoring was eliminated 14 cm H2O  Minimum SaO2 at final PAP pressure 91%  Mode of Therapy:BiPAP  ETCO2:No  CPAP changed to BiPAP:Yes  If yes why CPAP 15 was not effective in eliminating SDB    Mode of Therapy:BiPAP    EKG abnormalities: yes:  EPOCH example and comments: PVCs epoch 745, 763, etc    EEG abnormalities: no    Sleep Study Recorded < 2 hours: N/A    Sleep Study Recorded > 2 hours but incomplete study: N/A    Sleep Study Recorded 6 hours but no sleep obtained: NO    Patient classification: employed         Mask leakage was noel at times but interface was already too tight on patient's face  Significant improvements on BiPAP setting of 23/19 cm H2O in lateral position  Optimal BiPAP pressure for eliminating supine events was not determined because patient did not sleep in supine position again since pressure was increased to 23/19 cm H2O  Post-Sleep Study    Medication used at bedtime or during sleep study:NO    Patient reports time it took to fall asleep:greater than 60 minutes    Patient reports waking up during study:3 or more times  Patient reports returning to sleep in greater than 30 minutes      Patient reports sleeping 2 to 4 hours with dreaming  Patient reports sleep during study:typical    Patient rated sleepiness: Somewhat sleepy or tired    PAP treatment:yes: Post PAP treatment patient reports feeling unsure if a change is noted and  would wear PAP mask at home

## 2022-07-06 ENCOUNTER — TELEPHONE (OUTPATIENT)
Dept: SLEEP CENTER | Facility: CLINIC | Age: 57
End: 2022-07-06

## 2022-07-06 NOTE — TELEPHONE ENCOUNTER
Patient of Dr Joanna Parikh at [address]    Advised patient sleep study shows severe ANNETTE and dr Josie Stark ordered BIPAP  Advised patient to call Dr Nath American office for follow up  Phone number provided     Patient needs DME set up and compliance appointments

## 2022-07-20 ENCOUNTER — TELEPHONE (OUTPATIENT)
Dept: PULMONOLOGY | Facility: CLINIC | Age: 57
End: 2022-07-20

## 2022-07-20 NOTE — TELEPHONE ENCOUNTER
Patient is calling in regards to his BiPap machine and would like an update on his order  He has some questions   Please advise

## 2022-07-21 NOTE — TELEPHONE ENCOUNTER
Please call patient and provide him with [facility] number so he can ask for scheduling, they are in process of contacting him

## 2022-07-28 LAB

## 2022-08-08 NOTE — TELEPHONE ENCOUNTER
As per [employer] from [facility], Pt has a BiPap that has a SD card   He would have to bring in his Card to get the compliance

## 2022-08-08 NOTE — TELEPHONE ENCOUNTER
Patient calling stating he got his bipap and he is sleeping a lot better but stated he can only wear it for 3-4 hours and his stomach fills with air  He wants to know if the pressure can be adjusted   Please advise

## 2022-08-08 NOTE — TELEPHONE ENCOUNTER
Pt called and notified  He stated that he will go tomorrow and have the SD card downloaded and inform them to faxed compliance to us so that the provider can review

## 2022-08-12 NOTE — TELEPHONE ENCOUNTER
Pt called re: this matter  He stated that he brought his card to [facility] at 3pm on Tuesday and they were supposed to have faxed us the report  Did we get it?   Please advise: [phone]

## 2022-08-12 NOTE — TELEPHONE ENCOUNTER
I called [employer] and got his compliance  As per Chetna Romero from [facility], pt only has 1 week of compliance  Pt called and notified   Compliance scanned into pt's chart

## 2022-08-16 DIAGNOSIS — G47.33 OSA (OBSTRUCTIVE SLEEP APNEA): Primary | ICD-10-CM

## 2022-08-17 NOTE — TELEPHONE ENCOUNTER
Patient called back, I tried to set him up for a mask fitting but he doesn't want to go to [facility] [place] as that is far for him  He also is concerned as to why a mask fitting is wanted because he states he knows it's the pressure that's the problem  He would like the doctor to call him and discuss the pressure being changed/ need for a mask fitting   Please advise

## 2022-08-18 NOTE — TELEPHONE ENCOUNTER
I called and spoke with the patient and informed him  He also wanted to let you know he will be trying a new mask

## 2022-08-19 DIAGNOSIS — G47.33 OSA (OBSTRUCTIVE SLEEP APNEA): Primary | ICD-10-CM

## 2022-08-19 NOTE — TELEPHONE ENCOUNTER
We could lower his EPAP a bit for better tolerance with a close follow up for checking his compliance and making sure he is getting sufficient treatment for his severe ANNETTE   I will put the order in   Juan Francisco Magaña, see if we can squeeze him with me sometime over the next 2 weeks for a quick F/U

## 2022-08-23 NOTE — TELEPHONE ENCOUNTER
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

## 2022-08-26 LAB
DME PARACHUTE DELIVERY DATE REQUESTED: NORMAL
DME PARACHUTE DELIVERY NOTE: NORMAL
DME PARACHUTE ITEM DESCRIPTION: NORMAL
DME PARACHUTE ORDER STATUS: NORMAL
DME PARACHUTE SUPPLIER NAME: NORMAL
DME PARACHUTE SUPPLIER PHONE: NORMAL

## 2022-09-14 ENCOUNTER — OFFICE VISIT (OUTPATIENT)
Dept: PULMONOLOGY | Facility: HOSPITAL | Age: 57
End: 2022-09-14
Payer: COMMERCIAL

## 2022-09-14 VITALS
TEMPERATURE: 97.8 F | DIASTOLIC BLOOD PRESSURE: 64 MMHG | SYSTOLIC BLOOD PRESSURE: 120 MMHG | OXYGEN SATURATION: 97 % | BODY MASS INDEX: 29.22 KG/M2 | HEIGHT: 76 IN | HEART RATE: 68 BPM | RESPIRATION RATE: 16 BRPM | WEIGHT: 240 LBS

## 2022-09-14 DIAGNOSIS — G47.33 OSA (OBSTRUCTIVE SLEEP APNEA): Primary | ICD-10-CM

## 2022-09-14 DIAGNOSIS — G47.19 EXCESSIVE DAYTIME SLEEPINESS: ICD-10-CM

## 2022-09-14 DIAGNOSIS — E66.3 OVERWEIGHT (BMI 25.0-29.9): ICD-10-CM

## 2022-09-14 PROCEDURE — 99214 OFFICE O/P EST MOD 30 MIN: CPT | Performed by: INTERNAL MEDICINE

## 2022-09-14 NOTE — PATIENT INSTRUCTIONS
Sleep Apnea   AMBULATORY CARE:   Sleep apnea  is a condition that causes you to stop breathing often during sleep  Types of sleep apnea:   Obstructive sleep apnea (ANNETTE)  is the most common kind  The muscles and tissues around your throat relax and block air from passing through  Obesity, use of alcohol or cigarettes, or a family history are common causes  ANNETTE may increase your risk for complications after surgery  Central sleep apnea (CSA)  means your brain does not send signals to the muscles that control breathing  You do not take a breath even though your airway is open  Common causes include medical conditions such as heart failure, being older than 40, or use of opioids  Complex (or mixed) sleep apnea  means you have both obstructive and central sleep apnea  Common signs and symptoms:   Loud snoring or long pauses in breathing    Feeling sleepy, slow, and tired during the day    Snorting, gasping, or choking while you sleep, and waking up suddenly because of these    Feeling irritable during the day    Dry mouth or a headache in the mornings    Heavy night sweating    A hard time thinking, remembering things, or focusing on your tasks the following day    Call your local emergency number (911 in the [address] [facility]) if:   You have chest pain or trouble breathing  Call your doctor if:   You have new or worsening signs or symptoms  You have questions or concerns about your condition or care  Treatment  depends on the kind of apnea you have  A mouth device  may be needed if you have mild sleep apnea  These are designed to keep your throat open  Ask your dentist or healthcare provider about the best mouth device for you  A machine  may be used to help you get more air during sleep  A mask may be placed over your nose and mouth, or just your nose  The mask is hooked to the machine  You will get air through the mask      A continuous positive airway pressure (CPAP) machine  is used to keep your airway open during sleep  The machine blows a gentle stream of air into the mask when you breathe  This helps keep your airway open so you can breathe more regularly  Extra oxygen may be given through the machine  A bilevel positive airway pressure (BiPAP) machine  gives air but lowers the pressure when you breathe out  An adaptive servo-ventilator (ASV)  is a machine that learns your usual breathing pattern  Then, it uses pressure to give you air and prevent stops in your breathing  Surgery  to expand your airway or remove extra tissues may be needed  Surgery is usually only considered if other treatments do not work  Manage or prevent sleep apnea:   Reach and maintain a healthy weight  Ask your healthcare provider what a healthy weight is for you  Ask him or her to help you create a safe weight loss plan if you are overweight  Even a small goal of a 10% weight loss can improve your symptoms  Do not smoke  Nicotine and other chemicals in cigarettes and cigars can cause lung damage  Ask your healthcare provider for information if you currently smoke and need help to quit  E-cigarettes or smokeless tobacco still contain nicotine  Talk to your healthcare provider before you use these products  Do not drink alcohol or take sedative medicine before you go to sleep  Alcohol and sedatives can relax the muscles and tissues around your throat  This can block the airflow to your lungs  Sleep on your side or use pillows designed to prevent sleep apnea  This prevents your tongue or other tissues from blocking your throat  You can also raise the head of your bed  Follow up with your doctor or specialist as directed: You may need to have blood tests during your follow-up visits  Work with your provider to find the right breathing support equipment and settings for you  Write down your questions so you remember to ask them during your visits    © Copyright [employer] 2022 Information is for End User's use only and may not be sold, redistributed or otherwise used for commercial purposes  All illustrations and images included in CareNotes® are the copyrighted property of A D A M , Inc  or Ronaldo Fatima  The above information is an  only  It is not intended as medical advice for individual conditions or treatments  Talk to your doctor, nurse or pharmacist before following any medical regimen to see if it is safe and effective for you

## 2022-09-14 NOTE — PROGRESS NOTES
Pulmonary/Sleep Follow Up Note   Sathya Hall 61 y o  male MRN: [id number]  9/14/2022      Assessment and Plan:    1  ANNETTE (obstructive sleep apnea)  Assessment & Plan:  Severe ANNETTE with AHI of 70 6 recent diagnosed  The patient has been having issues with his BiPAP machine intolerance due to high pressure and aerophagia  We put an order to lower his pressure to in minimal EPAP of 12 down from 19 however unfortunately his machine does not have a modem so the order was not processed and he is scheduled to go to [facility] physically with his machine on 09/16/2022 to pressure change and possibly mask fit    I explained to the patient in details the pathophysiology of obstructive sleep apnea  I also explained the importance of treatment, and the consequences of untreated obstructive sleep apnea on underlying cardiovascular and cerebrovascular risk, morbidity, and mortality  2  Overweight (BMI 25 0-29  9)  Assessment & Plan:  BMI 29 21  noted      3  Excessive daytime sleepiness  Assessment & Plan:  Due to severe ANNETTE, currently still suboptimally treated due to high pressure tolerance as detailed above          Return in about 2 months (around 11/14/2022)  History of Present Illness   HPI:  Sathya Hall is a 61 y o  male who is here for follow-up appointment he has been recently diagnosed with severe ANNETTE with a split night study started on auto titrating BiPAP with minimal EPAP pressure of 19, the patient experiences high pressure intolerance and aerophagia and has been calling back, pressure change order has been ordered however his machine does not have a modem  The patient states that with using the machine started to feel some improvement of his excessive daytime sleepiness however has not been able to tolerate the machine more than 4 hours per night due to the high pressures      Review of Systems    Historical Information   Past Medical History:   Diagnosis Date    Heart attack ([address] )     High cholesterol     Hypertension      Past Surgical History:   Procedure Laterality Date    APPENDECTOMY       Family History   Problem Relation Age of Onset    Heart disease Father     Lung disease Neg Hx          Meds/Allergies     Current Outpatient Medications:     cholecalciferol (VITAMIN D3) 400 units tablet, Take 400 Units by mouth daily, Disp: , Rfl:     co-enzyme Q-10 30 MG capsule, Take 30 mg by mouth daily, Disp: , Rfl:     lisinopril (ZESTRIL) 10 mg tablet, Take 10 mg by mouth daily, Disp: , Rfl:     metoprolol succinate (TOPROL-XL) 50 mg 24 hr tablet, Take 50 mg by mouth daily, Disp: , Rfl:     multivitamin (THERAGRAN) TABS, Take 1 tablet by mouth daily, Disp: , Rfl:     rosuvastatin (CRESTOR) 10 MG tablet, Take 10 mg by mouth daily, Disp: , Rfl:   Allergies   Allergen Reactions    Sulfa Antibiotics Rash     Since infancy, does not know reaction        Vitals: Blood pressure 120/64, pulse 68, temperature 97 8 °F (36 6 °C), resp  rate 16, height 6' 4" (1 93 m), weight 109 kg (240 lb), SpO2 97 %  Body mass index is 29 21 kg/m²  Oxygen Therapy  SpO2: 97 %  Oxygen Therapy: None (Room air)      Physical Exam  General:  Awake alert and oriented x 3, conversant without conversational dyspnea, NAD, normal affect  HEENT:   Sclera noninjected, nonicteric OU, Nares patent,  no craniofacial abnormalities, Mucous membranes, moist, no oral lesions, normal dentition  NECK:  Trachea midline, no accessory muscle use, no stridor,  JVP not elevated  CARDIAC: Reg, single s1/S2, no m/r/g  PULM: CTA bilaterally no wheezing, rhonchi or rales  ABD: Soft nontender, nondistended, no rebound, no rigidity, no guarding  EXT: No cyanosis, no clubbing, no edema, normal capillary refill  NEURO: no focal neurologic deficits, AAOx3, moving all extremities appropriately    Labs: I have personally reviewed pertinent lab results  , ABG: No results found for: PHART, FZM1BOW, PO2ART, HRT2TJR, R3JFMCCV, BEART, SOURCE, BNP: No results found for: BNP, CBC: No results found for: WBC, HGB, HCT, MCV, PLT, ADJUSTEDWBC, MCH, MCHC, RDW, MPV, NRBC, CMP: No results found for: SODIUM, K, CL, CO2, ANIONGAP, BUN, CREATININE, GLUCOSE, CALCIUM, AST, ALT, ALKPHOS, PROT, BILITOT, EGFR, PT/INR: No results found for: PT, INR, Troponin: No results found for: TROPONINI  Lab Results   Component Value Date    WBC 3 69 (L) 02/15/2022    HGB 14 4 02/15/2022    HCT 43 6 02/15/2022    MCV 92 02/15/2022     02/15/2022     Lab Results   Component Value Date    CALCIUM 8 9 02/15/2022    K 4 4 02/15/2022    CO2 28 02/15/2022     02/15/2022    BUN 21 02/15/2022    CREATININE 0 95 02/15/2022     No results found for: IGE  Lab Results   Component Value Date    ALT 29 02/15/2022    AST 19 02/15/2022    ALKPHOS 81 02/15/2022             Sleep studies: I have personally reviewed pertinent reports  Split study 2022: AHI 70 6 events/hr --> severe ANNETTE              Lizzy Caballero MD  [facility]       Portions of the record may have been created with voice recognition software  Occasional wrong word or "sound a like" substitutions may have occurred due to the inherent limitations of voice recognition software  Read the chart carefully and recognize, using context, where substitutions have occurred

## 2022-09-14 NOTE — ASSESSMENT & PLAN NOTE
Due to severe ANNETTE, currently still suboptimally treated due to high pressure tolerance as detailed above

## 2022-09-14 NOTE — ASSESSMENT & PLAN NOTE
Severe ANNETTE with AHI of 70 6 recent diagnosed  The patient has been having issues with his BiPAP machine intolerance due to high pressure and aerophagia  We put an order to lower his pressure to in minimal EPAP of 12 down from 19 however unfortunately his machine does not have a modem so the order was not processed and he is scheduled to go to [facility] physically with his machine on 09/16/2022 to pressure change and possibly mask fit    I explained to the patient in details the pathophysiology of obstructive sleep apnea  I also explained the importance of treatment, and the consequences of untreated obstructive sleep apnea on underlying cardiovascular and cerebrovascular risk, morbidity, and mortality

## 2022-12-21 ENCOUNTER — OFFICE VISIT (OUTPATIENT)
Dept: PULMONOLOGY | Facility: HOSPITAL | Age: 57
End: 2022-12-21

## 2022-12-21 VITALS
BODY MASS INDEX: 29.96 KG/M2 | OXYGEN SATURATION: 98 % | HEART RATE: 64 BPM | WEIGHT: 246 LBS | HEIGHT: 76 IN | TEMPERATURE: 98.1 F | DIASTOLIC BLOOD PRESSURE: 80 MMHG | SYSTOLIC BLOOD PRESSURE: 120 MMHG

## 2022-12-21 DIAGNOSIS — R05.3 CHRONIC COUGH: ICD-10-CM

## 2022-12-21 DIAGNOSIS — G47.19 EXCESSIVE DAYTIME SLEEPINESS: ICD-10-CM

## 2022-12-21 DIAGNOSIS — G47.33 OSA (OBSTRUCTIVE SLEEP APNEA): Primary | ICD-10-CM

## 2022-12-21 DIAGNOSIS — E66.3 OVERWEIGHT (BMI 25.0-29.9): ICD-10-CM

## 2022-12-21 PROBLEM — R05.9 COUGH, UNSPECIFIED: Status: ACTIVE | Noted: 2022-12-21

## 2022-12-21 NOTE — ASSESSMENT & PLAN NOTE
Very possibly this could be post upper respiratory tract infection cough has been getting better, I discussed with him to start using Flonase nasal spray    Other factors that he is on ACE inhibitor lisinopril if the cough persisted for long consideration to switch to ARB's losartan but he would like to discuss that with his cardiologist

## 2022-12-21 NOTE — PROGRESS NOTES
Pulmonary/Sleep Follow Up Note   Marko Lechuga 61 y o  male MRN: [id number]  12/21/2022      Assessment and Plan:    1  ANNETTE (obstructive sleep apnea)  Assessment & Plan:  · Severe ANNETTE with AHI of 70 6 events per hour the patient had previous issues with high pressure tolerance on the BiPAP, his pressure was lowered EPAP to 12 with significant improvement he has been using it however he got a viral upper respiratory tract infection around Thanksgiving time and he has been intermittently not using the machine he also has problems with his full facemask he sounds like he is on currently a ResMed AirFit mask that is bothering his nasal bridge which I would agree with him and would like to switch him to a [employer] Vitera large   · I sent the order to [facility] today  · I explained to the patient in details the pathophysiology of obstructive sleep apnea  I also explained the importance of treatment, and the consequences of untreated obstructive sleep apnea on underlying cardiovascular and cerebrovascular risk, morbidity, and mortality  Orders:  -     PAP DME Resupply/Reorder    2  Excessive daytime sleepiness  Assessment & Plan: With underlying sleep apnea has been improving      3  Overweight (BMI 25 0-29  9)  Assessment & Plan: With a BMI of 29 94, noted        4  Chronic cough  Assessment & Plan:  Very possibly this could be post upper respiratory tract infection cough has been getting better, I discussed with him to start using Flonase nasal spray  Other factors that he is on ACE inhibitor lisinopril if the cough persisted for long consideration to switch to ARB's losartan but he would like to discuss that with his cardiologist        Return in about 1 year (around 12/21/2023)      History of Present Illness   HPI:  Marko Lechuga is a 61 y o  male who is here for follow-up appointment he has been recently diagnosed with severe ANNETTE with a split night study started on auto titrating BiPAP with minimal EPAP pressure of 19, the patient experiences high pressure intolerance and aerophagia his pressure was adjusted to EPAP of 12 was improvement unfortunately around Thanksgiving in late November he had an upper respiratory tract infection which has caused persistent cough and some intolerance to the BiPAP however overall he has been using it and feeling the benefit he feels better and he feels more rested upon awakening he is averaging 7 hours of sleep per night  He still has persistent dry cough which has been improving since then          Review of Systems   All other systems reviewed and are negative  Historical Information   Past Medical History:   Diagnosis Date   • Heart attack ([facility] [address] )    • High cholesterol    • Hypertension      Past Surgical History:   Procedure Laterality Date   • APPENDECTOMY       Family History   Problem Relation Age of Onset   • Heart disease Father    • Lung disease Neg Hx          Meds/Allergies     Current Outpatient Medications:   •  cholecalciferol (VITAMIN D3) 400 units tablet, Take 400 Units by mouth daily, Disp: , Rfl:   •  co-enzyme Q-10 30 MG capsule, Take 30 mg by mouth daily, Disp: , Rfl:   •  lisinopril (ZESTRIL) 10 mg tablet, Take 10 mg by mouth daily, Disp: , Rfl:   •  metoprolol succinate (TOPROL-XL) 50 mg 24 hr tablet, Take 50 mg by mouth daily, Disp: , Rfl:   •  multivitamin (THERAGRAN) TABS, Take 1 tablet by mouth daily, Disp: , Rfl:   •  rosuvastatin (CRESTOR) 10 MG tablet, Take 10 mg by mouth daily, Disp: , Rfl:   Allergies   Allergen Reactions   • Sulfa Antibiotics Rash     Since infancy, does not know reaction        Vitals: Blood pressure 120/80, pulse 64, temperature 98 1 °F (36 7 °C), temperature source Tympanic, height 6' 4" (1 93 m), weight 112 kg (246 lb), SpO2 98 %  Body mass index is 29 94 kg/m²   Oxygen Therapy  SpO2: 98 %  Oxygen Therapy: None (Room air)      Physical Exam  General:  Awake alert and oriented x 3, conversant without conversational dyspnea, NAD, normal affect  HEENT:   Sclera noninjected, nonicteric OU, Nares patent,  no craniofacial abnormalities, Mucous membranes, moist, no oral lesions, normal dentition  NECK:  Trachea midline, no accessory muscle use, no stridor,  JVP not elevated  CARDIAC: Reg, single s1/S2, no m/r/g  PULM: CTA bilaterally no wheezing, rhonchi or rales  ABD: Soft nontender, nondistended, no rebound, no rigidity, no guarding  EXT: No cyanosis, no clubbing, no edema, normal capillary refill  NEURO: no focal neurologic deficits, AAOx3, moving all extremities appropriately    Labs: I have personally reviewed pertinent lab results  , ABG: No results found for: PHART, FBF9WZY, PO2ART, WFB3TNE, P9ORSMOL, BEART, SOURCE, BNP: No results found for: BNP, CBC: No results found for: WBC, HGB, HCT, MCV, PLT, ADJUSTEDWBC, MCH, MCHC, RDW, MPV, NRBC, CMP: No results found for: SODIUM, K, CL, CO2, ANIONGAP, BUN, CREATININE, GLUCOSE, CALCIUM, AST, ALT, ALKPHOS, PROT, BILITOT, EGFR, PT/INR: No results found for: PT, INR, Troponin: No results found for: TROPONINI  Lab Results   Component Value Date    WBC 3 69 (L) 02/15/2022    HGB 14 4 02/15/2022    HCT 43 6 02/15/2022    MCV 92 02/15/2022     02/15/2022     Lab Results   Component Value Date    CALCIUM 8 9 02/15/2022    K 4 4 02/15/2022    CO2 28 02/15/2022     02/15/2022    BUN 21 02/15/2022    CREATININE 0 95 02/15/2022     No results found for: IGE  Lab Results   Component Value Date    ALT 29 02/15/2022    AST 19 02/15/2022    ALKPHOS 81 02/15/2022             Sleep studies: I have personally reviewed pertinent reports  Split study 2022: AHI 70 6 events/hr --> severe ANNETTE              Miracle Del Real MD  [facility]       Portions of the record may have been created with voice recognition software  Occasional wrong word or "sound a like" substitutions may have occurred due to the inherent limitations of voice recognition software   Read the chart carefully and recognize, using context, where substitutions have occurred

## 2022-12-21 NOTE — PATIENT INSTRUCTIONS
BiPAP   AMBULATORY CARE:   Bilevel positive airway pressure (BiPAP)  is a treatment that uses mild air pressure to keep your airways open while you sleep  BiPAP is used to treat obstructive sleep apnea (ANNETTE) in people who cannot tolerate CPAP treatment  It is also used in people with obesity hypoventilation syndrome, central apnea, or restrictive or obstructive lung problems  Difference between BiPAP and CPAP:  The BiPAP machine delivers a higher amount of air pressure when you breathe in than when you breathe out  CPAP delivers a constant level of air pressure during treatment  In both, the mask connects to the machine with a hose  Air pressure is delivered to the mask through the hose  Benefits of BiPAP:   Improves quality of sleep     Relieves daytime sleepiness     Improves memory    Reduces the risk of heart disease    Improves your mood and quality of life    Make BiPAP easier to use: At first, try to use your BiPAP for a few hours every night  Then slowly increase the length of time you use your machine  It takes time to adjust to BiPAP treatment  You may need a mask that is a different size, shape, or material   Talk to your healthcare provider if your mask feels uncomfortable or irritates your skin  You may need to use a special moisturizer made for users  Use a saline nasal spray at bedtime to help relieve nasal irritation  A chin strap to help keep your mouth closed or a different type of mask can help dry mouth  Some machines come with a heated humidifier to help relieve these symptoms  Talk to your healthcare provider if you are having problems adjusting to the air pressure  He or she can tell you how to adjust the air pressure on your BiPAP  You may need to start at a lower pressure and slowly increase it over time      Call your healthcare provider for any of the following:   Continued sleepiness during the day, even after wearing your BiPAP device as directed    Continued problems caused by BiPAP that do not improve    Questions or concerns about your condition, care, or equipment  Follow up with your healthcare provider as directed:  Tell your healthcare provider if your mask no longer fits properly  Write down your questions so you remember to ask them during your visits  © Copyright [employer] 2022 Information is for End User's use only and may not be sold, redistributed or otherwise used for commercial purposes  All illustrations and images included in CareNotes® are the copyrighted property of A D A M , Inc  or [facility] Sabrina Amaro   The above information is an  only  It is not intended as medical advice for individual conditions or treatments  Talk to your doctor, nurse or pharmacist before following any medical regimen to see if it is safe and effective for you

## 2022-12-21 NOTE — ASSESSMENT & PLAN NOTE
· Severe ANNETTE with AHI of 70 6 events per hour the patient had previous issues with high pressure tolerance on the BiPAP, his pressure was lowered EPAP to 12 with significant improvement he has been using it however he got a viral upper respiratory tract infection around Thanksgiving time and he has been intermittently not using the machine he also has problems with his full facemask he sounds like he is on currently a ResMed AirFit mask that is bothering his nasal bridge which I would agree with him and would like to switch him to a "[employer] Viter[employer] large   · I sent the order to [facility] today  · I explained to the patient in details the pathophysiology of obstructive sleep apnea  I also explained the importance of treatment, and the consequences of untreated obstructive sleep apnea on underlying cardiovascular and cerebrovascular risk, morbidity, and mortality

## 2023-02-19 PROBLEM — R05.9 COUGH, UNSPECIFIED: Status: RESOLVED | Noted: 2022-12-21 | Resolved: 2023-02-19

## 2023-05-14 ENCOUNTER — APPOINTMENT (EMERGENCY)
Dept: RADIOLOGY | Facility: HOSPITAL | Age: 58
End: 2023-05-14

## 2023-05-14 ENCOUNTER — HOSPITAL ENCOUNTER (EMERGENCY)
Facility: HOSPITAL | Age: 58
Discharge: HOME | End: 2023-05-14
Attending: EMERGENCY MEDICINE

## 2023-05-14 VITALS
TEMPERATURE: 98.2 F | DIASTOLIC BLOOD PRESSURE: 77 MMHG | HEART RATE: 89 BPM | SYSTOLIC BLOOD PRESSURE: 158 MMHG | RESPIRATION RATE: 18 BRPM | OXYGEN SATURATION: 92 %

## 2023-05-14 DIAGNOSIS — R06.00 DYSPNEA: Primary | ICD-10-CM

## 2023-05-14 DIAGNOSIS — R20.2 PARESTHESIAS: ICD-10-CM

## 2023-05-14 DIAGNOSIS — R07.9 CHEST PAIN: ICD-10-CM

## 2023-05-14 LAB
ALBUMIN SERPL BCP-MCNC: 4.7 G/DL (ref 3.5–5)
ALP SERPL-CCNC: 82 U/L (ref 34–104)
ALT SERPL W P-5'-P-CCNC: 24 U/L (ref 7–52)
ANION GAP SERPL CALCULATED.3IONS-SCNC: 7 MMOL/L (ref 4–13)
AST SERPL W P-5'-P-CCNC: 23 U/L (ref 13–39)
BASOPHILS # BLD AUTO: 0.01 THOUSANDS/ÂΜL (ref 0–0.1)
BASOPHILS NFR BLD AUTO: 0 % (ref 0–1)
BILIRUB SERPL-MCNC: 0.32 MG/DL (ref 0.2–1)
BNP SERPL-MCNC: 16 PG/ML (ref 0–100)
BUN SERPL-MCNC: 17 MG/DL (ref 5–25)
CALCIUM SERPL-MCNC: 9.6 MG/DL (ref 8.4–10.2)
CARDIAC TROPONIN I PNL SERPL HS: 3 NG/L
CHLORIDE SERPL-SCNC: 105 MMOL/L (ref 96–108)
CO2 SERPL-SCNC: 25 MMOL/L (ref 21–32)
CREAT SERPL-MCNC: 0.83 MG/DL (ref 0.6–1.3)
EOSINOPHIL # BLD AUTO: 0.15 THOUSAND/ÂΜL (ref 0–0.61)
EOSINOPHIL NFR BLD AUTO: 3 % (ref 0–6)
ERYTHROCYTE [DISTWIDTH] IN BLOOD BY AUTOMATED COUNT: 11.8 % (ref 11.6–15.1)
GFR SERPL CREATININE-BSD FRML MDRD: 96 ML/MIN/1.73SQ M
GLUCOSE SERPL-MCNC: 102 MG/DL (ref 65–140)
HCT VFR BLD AUTO: 40 % (ref 36.5–49.3)
HGB BLD-MCNC: 14.1 G/DL (ref 12–17)
IMM GRANULOCYTES # BLD AUTO: 0.01 THOUSAND/UL (ref 0–0.2)
IMM GRANULOCYTES NFR BLD AUTO: 0 % (ref 0–2)
LYMPHOCYTES # BLD AUTO: 1.62 THOUSANDS/ÂΜL (ref 0.6–4.47)
LYMPHOCYTES NFR BLD AUTO: 30 % (ref 14–44)
MCH RBC QN AUTO: 31.2 PG (ref 26.8–34.3)
MCHC RBC AUTO-ENTMCNC: 35.3 G/DL (ref 31.4–37.4)
MCV RBC AUTO: 89 FL (ref 82–98)
MONOCYTES # BLD AUTO: 0.45 THOUSAND/ÂΜL (ref 0.17–1.22)
MONOCYTES NFR BLD AUTO: 8 % (ref 4–12)
NEUTROPHILS # BLD AUTO: 3.09 THOUSANDS/ÂΜL (ref 1.85–7.62)
NEUTS SEG NFR BLD AUTO: 59 % (ref 43–75)
NRBC BLD AUTO-RTO: 0 /100 WBCS
PLATELET # BLD AUTO: 153 THOUSANDS/UL (ref 149–390)
PMV BLD AUTO: 9.6 FL (ref 8.9–12.7)
POTASSIUM SERPL-SCNC: 3.8 MMOL/L (ref 3.5–5.3)
PROT SERPL-MCNC: 7.1 G/DL (ref 6.4–8.4)
RBC # BLD AUTO: 4.52 MILLION/UL (ref 3.88–5.62)
SODIUM SERPL-SCNC: 137 MMOL/L (ref 135–147)
WBC # BLD AUTO: 5.33 THOUSAND/UL (ref 4.31–10.16)

## 2023-05-14 RX ORDER — MAGNESIUM HYDROXIDE/ALUMINUM HYDROXICE/SIMETHICONE 120; 1200; 1200 MG/30ML; MG/30ML; MG/30ML
30 SUSPENSION ORAL ONCE
Status: COMPLETED | OUTPATIENT
Start: 2023-05-14 | End: 2023-05-14

## 2023-05-14 RX ADMIN — ALUMINUM HYDROXIDE, MAGNESIUM HYDROXIDE, AND SIMETHICONE 30 ML: 200; 200; 20 SUSPENSION ORAL at 20:40

## 2023-05-15 LAB
ATRIAL RATE: 90 BPM
P AXIS: 63 DEGREES
PR INTERVAL: 158 MS
QRS AXIS: 28 DEGREES
QRSD INTERVAL: 140 MS
QT INTERVAL: 390 MS
QTC INTERVAL: 477 MS
T WAVE AXIS: 52 DEGREES
VENTRICULAR RATE: 90 BPM

## 2023-05-15 NOTE — ED PROVIDER NOTES
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 Taking? cholecalciferol (VITAMIN D3) 400 units tablet   Yes No   Sig: Take 400 Units by mouth daily   co-enzyme Q-10 30 MG capsule   Yes No   Sig: Take 30 mg by mouth daily   lisinopril (ZESTRIL) 10 mg tablet   Yes No   Sig: Take 10 mg by mouth daily   metoprolol succinate (TOPROL-XL) 50 mg 24 hr tablet   Yes No   Sig: Take 50 mg by mouth daily   multivitamin (THERAGRAN) TABS   Yes No   Sig: Take 1 tablet by mouth daily   rosuvastatin (CRESTOR) 10 MG tablet   Yes No   Sig: Take 10 mg by mouth daily      Facility-Administered Medications: None       Past Medical History:   Diagnosis Date   • Heart attack ([facility] [address] )    • High cholesterol    • Hypertension        Past Surgical History:   Procedure Laterality Date   • APPENDECTOMY         Family History   Problem Relation Age of Onset   • Heart disease Father    • Lung disease Neg Hx      I have reviewed and agree with the history as documented      E-Cigarette/Vaping     E-Cigarette/Vaping Substances     Social History     Tobacco Use   • Smoking status: Former     Types: Cigars     Start date:      Quit date: 2022     Years since quittin 5   • Smokeless tobacco: Never   • Tobacco comments:     two cigars a day at his worst    Substance Use Topics   • Alcohol use: Not Currently     Comment: 6 pack a day   • Drug use: Not Currently     Types: Marijuana       Review of Systems    Physical Exam  Physical Exam  Vitals and nursing note reviewed  HENT:      Mouth/Throat:      Mouth: Mucous membranes are moist    Eyes:      Conjunctiva/sclera: Conjunctivae normal    Cardiovascular:      Rate and Rhythm: Normal rate and regular rhythm  Pulses: Normal pulses  Heart sounds: Normal heart sounds  Pulmonary:      Effort: Pulmonary effort is normal  No respiratory distress  Breath sounds: Normal breath sounds  Abdominal:      General: There is no distension  Tenderness: There is no abdominal tenderness  Musculoskeletal:      Right lower leg: No edema  Left lower leg: No edema  Skin:     General: Skin is warm and dry  Neurological:      Mental Status: He is alert           Vital Signs  ED Triage Vitals [05/14/23 1959]   Temperature Pulse Respirations Blood Pressure SpO2   98 2 °F (36 8 °C) 97 18 158/96 98 %      Temp Source Heart Rate Source Patient Position - Orthostatic VS BP Location FiO2 (%)   Temporal Monitor Sitting Left arm --      Pain Score       2           Vitals:    05/14/23 1959 05/14/23 2045 05/14/23 2100   BP: 158/96 141/76 158/77   Pulse: 97 85 89   Patient Position - Orthostatic VS: Sitting Lying Lying         Visual Acuity      ED Medications  Medications   aluminum-magnesium hydroxide-simethicone (MYLANTA) oral suspension 30 mL (30 mL Oral Given 5/14/23 2040)       Diagnostic Studies  Results Reviewed     Procedure Component Value Units Date/Time    B-Type Natriuretic Peptide(BNP) [[id number]]  (Normal) Collected: 05/14/23 2010    Lab Status: Final result Specimen: Blood from Arm, Right Updated: 05/14/23 2101     BNP 16 pg/mL     HS Troponin 0hr (reflex protocol) [[id number]]  (Normal) Collected: 05/14/23 2010    Lab Status: Final result Specimen: Blood from Arm, Right Updated: 05/14/23 2038     hs TnI 0hr 3 ng/L     Comprehensive metabolic panel [[id number]] Collected: 05/14/23 2010    Lab Status: Final result Specimen: Blood from Arm, Right Updated: 05/14/23 2034     Sodium 137 mmol/L      Potassium 3 8 mmol/L      Chloride 105 mmol/L      CO2 25 mmol/L      ANION GAP 7 mmol/L      BUN 17 mg/dL      Creatinine 0 83 mg/dL      Glucose 102 mg/dL      Calcium 9 6 mg/dL      AST 23 U/L      ALT 24 U/L      Alkaline Phosphatase 82 U/L      Total Protein 7 1 g/dL      Albumin 4 7 g/dL      Total Bilirubin 0 32 mg/dL      eGFR 96 ml/min/1 73sq m     Narrative:      [place] guidelines for Chronic Kidney Disease (CKD):   •  Stage 1 with normal or high GFR (GFR > 90 mL/min/1 73 square meters)  •  Stage 2 Mild CKD (GFR = 60-89 mL/min/1 73 square meters)  •  Stage 3A Moderate CKD (GFR = 45-59 mL/min/1 73 square meters)  •  Stage 3B Moderate CKD (GFR = 30-44 mL/min/1 73 square meters)  •  Stage 4 Severe CKD (GFR = 15-29 mL/min/1 73 square meters)  •  Stage 5 End Stage CKD (GFR <15 mL/min/1 73 square meters)  Note: GFR calculation is accurate only with a steady state creatinine    CBC and differential [[id number]] Collected: 05/14/23 2010    Lab Status: Final result Specimen: Blood from Arm, Right Updated: 05/14/23 2017     WBC 5 33 Thousand/uL      RBC 4 52 Million/uL      Hemoglobin 14 1 g/dL      Hematocrit 40 0 %      MCV 89 fL      MCH 31 2 pg      MCHC 35 3 g/dL      RDW 11 8 %      MPV 9 6 fL      Platelets 897 Thousands/uL      nRBC 0 /100 WBCs      Neutrophils Relative 59 %      Immat GRANS % 0 %      Lymphocytes Relative 30 %      Monocytes Relative 8 %      Eosinophils Relative 3 %      Basophils Relative 0 %      Neutrophils Absolute 3 09 Thousands/µL      Immature Grans Absolute 0 01 Thousand/uL      Lymphocytes Absolute 1 62 Thousands/µL      Monocytes Absolute 0 45 Thousand/µL      Eosinophils Absolute 0 15 Thousand/µL Basophils Absolute 0 01 Thousands/µL                  XR chest 1 view portable   ED Interpretation by Ruben Gordon MD (05/14 2041)   No acute pulmonary pathology                 Procedures  ECG 12 Lead Documentation Only    Date/Time: 5/14/2023 8:20 PM  Performed by: Ruben Gordon MD  Authorized by: Ruben Gordon MD     Indications / Diagnosis:  Chest pain  Patient location:  ED  Previous ECG:     Previous ECG:  Compared to current    Comparison ECG info:  2/15/22 normal sinus rhythm with rbbb    Similarity:  No change  Interpretation:     Interpretation: abnormal    Rate:     ECG rate:  90    ECG rate assessment: normal    Rhythm:     Rhythm: sinus rhythm    Ectopy:     Ectopy: none    QRS:     QRS axis:  Normal    QRS intervals: Wide  Conduction:     Conduction: abnormal      Abnormal conduction: complete RBBB    ST segments:     ST segments:  Normal  T waves:     T waves: normal               ED Course             HEART Risk Score    Flowsheet Row Most Recent Value   Heart Score Risk Calculator    History 0 Filed at: 05/14/2023 2126   ECG 1 Filed at: 05/14/2023 2126   Age 1 Filed at: 05/14/2023 2126   Risk Factors 2 Filed at: 05/14/2023 2126   Troponin 0 Filed at: 05/14/2023 2126   HEART Score 4 Filed at: 05/14/2023 2126                        SBIRT 20yo+    Flowsheet Row Most Recent Value   Initial Alcohol Screen: US AUDIT-C     1  How often do you have a drink containing alcohol? 0 Filed at: 05/14/2023 2040   2  How many drinks containing alcohol do you have on a typical day you are drinking? 0 Filed at: 05/14/2023 2040   3a  Male UNDER 65: How often do you have five or more drinks on one occasion? 0 Filed at: 05/14/2023 2040   Audit-C Score 0 Filed at: 05/14/2023 2040   MICHELLE: How many times in the past year have you    Used an illegal drug or used a prescription medication for non-medical reasons?  Never Filed at: 05/14/2023 2040                    Medical Decision Making  61year old male presents for evaluation of burning chest pain radiating from his epigastrium associated with dyspnea and paresthesias  EKG without acute ischemic changes or arrhythmia on my independent interpretation  Labs unremarkable  CXR unremarkable on my independent interpretation  Improvement in chest pain with maalox  Likely element of GERD  Patient has omeprazole at home, but has been taking it PRN  HEART score 4  Ambulatory referral for cardiology placed  Patient has been following with [facility]  PCP follow up for paresthesias  Return precautions provided  Amount and/or Complexity of Data Reviewed  Labs: ordered  Radiology: ordered and independent interpretation performed  Risk  OTC drugs  Disposition  Final diagnoses:   Dyspnea   Chest pain   Paresthesias     Time reflects when diagnosis was documented in both MDM as applicable and the Disposition within this note     Time User Action Codes Description Comment    5/14/2023  9:25 PM [place] Edelson Add [R06 00] Dyspnea     5/14/2023  9:25 PM [place] Edelson Add [R07 9] Chest pain     5/14/2023  9:25 PM [place] Edelson Add [R20 2] Paresthesias       ED Disposition     ED Disposition   Discharge    Condition   Stable    Date/Time   Sun May 14, 2023  9:25 PM    Comment   Estefania Roa discharge to home/self care                 Follow-up Information     Follow up With Specialties Details Why Contact Info Additional Information    your primary care provider  Schedule an appointment as soon as possible for a visit in 3 days for re-evaluation of your paresthesias      [address] [facility] [place] [facility] Cardiology Schedule an appointment as soon as possible for a visit in 1 week for re-evaluation of chest pain [address]  [facility] [place] [id number]  [address]  Cardiology [facility], [address] [place] [address] [facility] [address] Emergency Department Emergency Medicine Go to  If symptoms worsen [address] [place],[address] [address]  [phone] [address] Emergency Department, [address] [place] [address] [place]          Discharge Medication List as of 5/14/2023  9:28 PM      CONTINUE these medications which have NOT CHANGED    Details   cholecalciferol (VITAMIN D3) 400 units tablet Take 400 Units by mouth daily, Historical Med      co-enzyme Q-10 30 MG capsule Take 30 mg by mouth daily, Historical Med      lisinopril (ZESTRIL) 10 mg tablet Take 10 mg by mouth daily, Historical Med      metoprolol succinate (TOPROL-XL) 50 mg 24 hr tablet Take 50 mg by mouth daily, Historical Med      multivitamin (THERAGRAN) TABS Take 1 tablet by mouth daily, Historical Med      rosuvastatin (CRESTOR) 10 MG tablet Take 10 mg by mouth daily, Historical Med                 PDMP Review     None          ED Provider  Electronically Signed by           Karl Hanley MD  05/14/23 9310